# Patient Record
Sex: FEMALE | Race: BLACK OR AFRICAN AMERICAN | Employment: FULL TIME | URBAN - METROPOLITAN AREA
[De-identification: names, ages, dates, MRNs, and addresses within clinical notes are randomized per-mention and may not be internally consistent; named-entity substitution may affect disease eponyms.]

---

## 2021-12-27 ENCOUNTER — HOSPITAL ENCOUNTER (EMERGENCY)
Age: 41
Discharge: HOME | End: 2021-12-27
Payer: COMMERCIAL

## 2021-12-27 VITALS
TEMPERATURE: 98 F | HEART RATE: 86 BPM | SYSTOLIC BLOOD PRESSURE: 119 MMHG | DIASTOLIC BLOOD PRESSURE: 76 MMHG | RESPIRATION RATE: 16 BRPM

## 2021-12-27 LAB
APPEARANCE UR: CLEAR
BACTERIA URNS QL MICRO: NEGATIVE /HPF
BILIRUB UR QL: NEGATIVE
CLUE CELLS VAG QL WET PREP: NORMAL
COLOR UR: ABNORMAL
EPITH CASTS URNS QL MICRO: ABNORMAL /LPF
FIBRONECTIN FETAL VAG QL: NEGATIVE
GLUCOSE UR STRIP.AUTO-MCNC: NEGATIVE MG/DL
HGB UR QL STRIP: ABNORMAL
KETONES UR QL STRIP.AUTO: NEGATIVE MG/DL
KOH PREP SPEC: NORMAL
LEUKOCYTE ESTERASE UR QL STRIP.AUTO: NEGATIVE
NITRITE UR QL STRIP.AUTO: NEGATIVE
PH UR STRIP: 7 [PH] (ref 5–8)
PROT UR STRIP-MCNC: NEGATIVE MG/DL
RBC #/AREA URNS HPF: ABNORMAL /HPF (ref 0–5)
SERVICE CMNT-IMP: NORMAL
SP GR UR REFRACTOMETRY: 1.01 (ref 1–1.03)
T VAGINALIS VAG QL WET PREP: NORMAL
UA: UC IF INDICATED,UAUC: ABNORMAL
UROBILINOGEN UR QL STRIP.AUTO: 0.2 EU/DL (ref 0.2–1)
WBC URNS QL MICRO: ABNORMAL /HPF (ref 0–4)

## 2021-12-27 PROCEDURE — 82731 ASSAY OF FETAL FIBRONECTIN: CPT

## 2021-12-27 PROCEDURE — 81001 URINALYSIS AUTO W/SCOPE: CPT

## 2021-12-27 PROCEDURE — 59025 FETAL NON-STRESS TEST: CPT

## 2021-12-27 PROCEDURE — 74011250636 HC RX REV CODE- 250/636: Performed by: MIDWIFE

## 2021-12-27 PROCEDURE — 36415 COLL VENOUS BLD VENIPUNCTURE: CPT

## 2021-12-27 PROCEDURE — 99285 EMERGENCY DEPT VISIT HI MDM: CPT

## 2021-12-27 PROCEDURE — 87210 SMEAR WET MOUNT SALINE/INK: CPT

## 2021-12-27 PROCEDURE — 74011250637 HC RX REV CODE- 250/637: Performed by: MIDWIFE

## 2021-12-27 RX ORDER — ACETAMINOPHEN 500 MG
1000 TABLET ORAL ONCE
Status: COMPLETED | OUTPATIENT
Start: 2021-12-27 | End: 2021-12-27

## 2021-12-27 RX ADMIN — ACETAMINOPHEN 1000 MG: 500 TABLET ORAL at 21:04

## 2021-12-27 RX ADMIN — SODIUM CHLORIDE, POTASSIUM CHLORIDE, SODIUM LACTATE AND CALCIUM CHLORIDE 1000 ML: 600; 310; 30; 20 INJECTION, SOLUTION INTRAVENOUS at 20:55

## 2021-12-28 NOTE — DISCHARGE INSTRUCTIONS
Patient Education      Avoid heavy lifting and strenuous activity, hydrate well and keep your bladder empty, follow up with OB when you return home this week. Come back to the hospital if you experience bright red vaginal bleeding, leaking of fluid, or painful contractions that are occurring every 5 minutes or more. Weeks 32 to 34 of Your Pregnancy: Care Instructions  Overview     During the last few weeks of your pregnancy, you may have more aches and pains. It's important to rest when you can. Your growing baby is putting more pressure on your bladder. So you may need to urinate more often. Hemorrhoids are also common. These are painful, itchy veins in the rectal area. You may want to talk with your doctor about banking your baby's umbilical cord blood. This is the blood left in the cord after birth. If you want to save this blood, you must arrange it ahead of time. You can't decide at the last minute. If you haven't already had the Tdap shot during this pregnancy, talk to your doctor about getting it. It will help protect your  against pertussis infection. Follow-up care is a key part of your treatment and safety. Be sure to make and go to all appointments, and call your doctor if you are having problems. It's also a good idea to know your test results and keep a list of the medicines you take. How can you care for yourself at home? Ease hemorrhoids  · Get more liquids, fruits, vegetables, and fiber in your diet. This will help keep your stools soft. · Avoid sitting for too long. Lie on your left side several times a day. · Clean yourself with soft, moist toilet paper. Or you can use witch hazel pads or personal hygiene pads. · If you are uncomfortable, try ice packs. Or you can sit in a warm sitz bath. Do these for 20 minutes at a time, as needed. · Use hydrocortisone cream for pain and itching. Two examples are Anusol and Preparation H Hydrocortisone.   · Ask your doctor about taking an over-the-counter stool softener. Consider breastfeeding  · Experts recommend breastfeeding for 1 year or longer. · Breast milk may help protect your child from some health problems.  babies are less likely than formula-fed babies to:  ? Get ear infections, colds, diarrhea, and pneumonia. ? Be obese or get diabetes later in life. · Breastfeeding causes the release of a hormone called oxytocin. This hormone may help your uterus shrink back faster. · Breastfeeding may help you lose weight faster. Making milk burns calories. · Breastfeeding can lower your risk of breast cancer, ovarian cancer, and osteoporosis. Decide about circumcision for your baby  · As you make this decision, it may help to think about your personal, Synagogue, and family traditions. You get to decide if you will keep your baby's penis natural or if your baby will be circumcised. · If you decide that you would like to have your baby circumcised, talk with your doctor. You can share your concerns about pain. And you can discuss your preferences for anesthesia. Where can you learn more? Go to http://www.[employer].com/  Enter X711 in the search box to learn more about \"Weeks 32 to 34 of Your Pregnancy: Care Instructions. \"  Current as of: June 16, 2021               Content Version: 13.0  © 5775-4423 [employer]. Care instructions adapted under license by [employer] (which disclaims liability or warranty for this information). If you have questions about a medical condition or this instruction, always ask your healthcare professional. [place] [address] any warranty or liability for your use of this information.

## 2021-12-28 NOTE — ED PROVIDER NOTES
@ 32+4 arrives to [facility] with c/o pelvic pressure and cramping x 2 days, low back pain intermittently, and mucus-brown stringy discharge. She also admits to poor PO hydration, urinary frequency, and bladder fullness. Reports wnl FM. Denies bleeding. Denies SROM. Here visiting family for the holidays with a plan to return home to [facility]. Pt reports uncomplicated PNC with surveillance with MFM d/t AMA, but all wnl testing (OB record not currently available). No past medical history on file. No past surgical history on file. No family history on file. Social History     Socioeconomic History    Marital status: Not on file     Spouse name: Not on file    Number of children: Not on file    Years of education: Not on file    Highest education level: Not on file   Occupational History    Not on file   Tobacco Use    Smoking status: Not on file    Smokeless tobacco: Not on file   Substance and Sexual Activity    Alcohol use: Not on file    Drug use: Not on file    Sexual activity: Not on file   Other Topics Concern    Not on file   Social History Narrative    Not on file     Social Determinants of Health     Financial Resource Strain:     Difficulty of Paying Living Expenses: Not on file   Food Insecurity:     Worried About Running Out of Food in the Last Year: Not on file    Rehan of Food in the Last Year: Not on file   Transportation Needs:     Lack of Transportation (Medical): Not on file    Lack of Transportation (Non-Medical):  Not on file   Physical Activity:     Days of Exercise per Week: Not on file    Minutes of Exercise per Session: Not on file   Stress:     Feeling of Stress : Not on file   Social Connections:     Frequency of Communication with Friends and Family: Not on file    Frequency of Social Gatherings with Friends and Family: Not on file    Attends [employer] Services: Not on file    Active Member of Clubs or Organizations: Not on file   [address] [place] [address] Attends Club or Organization Meetings: Not on file    Marital Status: Not on file   Intimate Partner Violence:     Fear of Current or Ex-Partner: Not on file    Emotionally Abused: Not on file    Physically Abused: Not on file    Sexually Abused: Not on file   Housing Stability:     Unable to Pay for Housing in the Last Year: Not on file    Number of Marcelo in the Last Year: Not on file    Unstable Housing in the Last Year: Not on file         ALLERGIES: Patient has no allergy information on record. Review of Systems   Constitutional: Negative. Negative for chills and fever. HENT: Negative. Eyes: Negative. Respiratory: Negative. Cardiovascular: Negative. Gastrointestinal: Negative. Negative for constipation, diarrhea, nausea and vomiting. Endocrine: Negative. Genitourinary: Positive for dysuria, frequency, pelvic pain, vaginal discharge and vaginal pain. Negative for hematuria and vaginal bleeding. Musculoskeletal: Positive for back pain. Negative for gait problem, neck pain and neck stiffness. Skin: Negative. Allergic/Immunologic: Negative. Neurological: Negative. Negative for light-headedness and headaches. Hematological: Negative. Psychiatric/Behavioral: Negative. Vitals:    12/27/21 2009   BP: 119/76   Pulse: 86   Resp: 16   Temp: 98 °F (36.7 °C)            Physical Exam  Constitutional:       Appearance: Normal appearance. HENT:      Head: Normocephalic. Cardiovascular:      Rate and Rhythm: Normal rate. Pulmonary:      Effort: Pulmonary effort is normal.   Abdominal:      Palpations: Abdomen is soft. Tenderness: There is no abdominal tenderness. There is no right CVA tenderness or left CVA tenderness. Genitourinary:     General: Normal vulva. Rectum: Normal.      Comments: visually closed but digitally 1/60/-3, mucus noted at os  Musculoskeletal:         General: Normal range of motion. Cervical back: Normal range of motion. Skin:     General: Skin is warm and dry. Neurological:      General: No focal deficit present. Mental Status: She is alert and oriented to person, place, and time. Psychiatric:         Mood and Affect: Mood normal.         Behavior: Behavior normal.      EFM: 150, moderate variability, accels present, no decels    MDM  Number of Diagnoses or Management Options  Risk of Complications, Morbidity, and/or Mortality  Presenting problems: moderate  Diagnostic procedures: moderate  Management options: moderate    Patient Progress  Patient progress: stable    ED Course as of 12/27/21 2049   Mon Dec 27, 2021   2042 G1 @ 32+4 with urinary sx, pelvic pressure, mucus discharge, and back pain  1. Start IV fluids  2. Tylenol 1gm PO x 1 now  3. Send UA, wet prep, FFN  4.  CEFM [EF]      ED Course User Index  [EF] Jack Parra CNM       Procedures

## 2021-12-28 NOTE — PROGRESS NOTES
2010: , 32.4wks, pt from [place] (here visiting family) presents to [facility] 3 c/o mucus-brown, stringy vaginal discharge that started yesterday, low abdominal cramping/pelvic pressure and lower back pain. Denies LOF or VB, and confirms +FM. Denies medical hx excluding hx of HSV.     : E Eloisa DAVIS made aware of pts arrival and given brief sbar report. : Eloisa DAVIS at bedside to assess. : Vaginal speculum exam performed by CNKARINE. FFN, KOH/Wet prep, UA collected and sent. SVE /. Orders received for IV hydration. : 1 liter LR fluid bolus started. : 1 gm PO tylenol given. : Malika Wu called from lab with FFN result, FFN is negative. : E Eloisa NICKERSONM made aware of pts lab results. : Eloisa NICKERSONM at bedside to assess pt and discuss lab results, SVE unchanged. Discussed option of discharge home and follow up with her OB after she gets home tomorrow. Pt comfortable with plan. 225: IV removed by RN. 2300: Pt discharged home at this time. Written d/c instructions verbally reviewed with pt.